# Patient Record
(demographics unavailable — no encounter records)

---

## 2019-09-24 NOTE — DIAGNOSTIC IMAGING REPORT
EXAMINATION:  ANKLE 3 + VIEWS RIGHT, FOOT RIGHT COMPLETE    



INDICATION: Soft tissue wound, injury



COMPARISON: None

     

FINDINGS:



AP, lateral, and mortise views of the right ankle and AP and lateral and

oblique views of the right foot were obtained.



Right foot: There are postoperative changes of prior internal fixation

involving the talus and navicular. 2 screw fragments remain within the talus.

There is sclerosis and partial bony bridging along an old talar fracture. No

acute fracture or dislocation. No specific radiographic evidence of

osteomyelitis. Small plantar calcaneal spur.



Right ankle: Post surgical findings as above. No acute fracture or dislocation.

The ankle mortise is intact and symmetric. Soft tissue swelling about the right

ankle.



IMPRESSION: 

No acute fracture or dislocation. No specific radiographic evidence of

osteomyelitis.



Postoperative and posttraumatic changes of the right talus.



Signed by: Yodit Mcdaniel MD on 9/24/2019 3:56 PM